# Patient Record
Sex: FEMALE | Race: WHITE | NOT HISPANIC OR LATINO | Employment: FULL TIME
[De-identification: names, ages, dates, MRNs, and addresses within clinical notes are randomized per-mention and may not be internally consistent; named-entity substitution may affect disease eponyms.]

---
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
## 2018-01-02 ENCOUNTER — CHARTING TRANS (OUTPATIENT)
Dept: OTHER | Age: 18
End: 2018-01-02

## 2018-01-10 ENCOUNTER — CHARTING TRANS (OUTPATIENT)
Dept: OTHER | Age: 18
End: 2018-01-10

## 2018-01-12 ENCOUNTER — CHARTING TRANS (OUTPATIENT)
Dept: OTHER | Age: 18
End: 2018-01-12

## 2018-03-15 ENCOUNTER — CHARTING TRANS (OUTPATIENT)
Dept: OTHER | Age: 18
End: 2018-03-15

## 2018-03-21 ENCOUNTER — CHARTING TRANS (OUTPATIENT)
Dept: OTHER | Age: 18
End: 2018-03-21

## 2018-03-21 ENCOUNTER — LAB SERVICES (OUTPATIENT)
Dept: OTHER | Age: 18
End: 2018-03-21

## 2018-03-21 LAB
ALBUMIN: 4.7 GM/DL (ref 3.5–5)
ALKALINE PHOSPHATASE: 65 U/L (ref 38–126)
ALT: 23 U/L (ref 9–72)
ANION GAP: 13 MEQ/L (ref 8–16)
APPEARANCE: NORMAL
AST/SGOT: 19 U/L (ref 14–53)
BASO #: 0.04 K/UL (ref 0–0.2)
BASO %: 1 % (ref 0–2)
BILIRUBIN TOTAL: 0.4 MG/DL (ref 0.2–1.3)
BILIRUBIN: NEGATIVE
BUN: 14 MG/DL (ref 7–17)
CALCIUM: 9.8 MG/DL (ref 8.4–10.2)
CARBON DIOXIDE: 28 MMOL/L (ref 22–30)
CHLORIDE: 103 MMOL/L (ref 98–107)
COLOR: CLEAR
CREATININE: 0.85 MG/DL (ref 0.52–1.04)
EGFR FOR AFRICAN AMERICANS: > 60
EGFR FOR NON-AFRICAN AMERICANS: > 60
EOS #: 0.12 K/UL (ref 0–0.6)
EOS %: 1 % (ref 0–4)
GLUCOSE U: NEGATIVE
GLUCOSE: 101 MG/DL (ref 70–100)
HEMATOCRIT: 41 % (ref 37–47)
HEMOGLOBIN: 13.7 GM/DL (ref 12–16)
KETONES: NEGATIVE
LEUKOCYTE ESTERASE: NORMAL
LYMPH #: 2.38 K/UL (ref 1.2–5.2)
LYMPH %: 29 % (ref 27–40)
MEAN CORPUSCULAR HEMOGLOBIN: 29.9 PG/CELL (ref 27–35)
MEAN CORPUSCULAR HGB CONC: 33.4 G/DL (ref 32–36)
MEAN CORPUSCULAR VOLUME: 89.5 FL (ref 81–102)
MEAN PLATELET VOLUME: 10.8 FL (ref 7.4–12)
MONO #: 0.78 K/UL (ref 0–1)
MONO %: 9 % (ref 2–10)
NEUTROPHILS #: 4.99 K/UL (ref 1.8–8)
NEUTROPHILS %: 60 % (ref 40–62)
NITRITE: NEGATIVE
OCCULT BLOOD: NEGATIVE
PH: 6
PLATELET COUNT: 249 K/UL (ref 145–400)
POTASSIUM: 4.1 MMOL/L (ref 3.6–5)
PROTEIN: NEGATIVE
RED CELL COUNT: 4.58 M/UL (ref 3.8–5.1)
RED CELL DISTRIBUTION WIDTH: 13 % (ref 11.5–14.5)
SODIUM: 144 MMOL/L (ref 137–145)
TOTAL PROTEIN: 8.3 GM/DL (ref 6.3–8.3)
URINE SPEC GRAVITY: 1.02
UROBILINOGEN: 0.2
WHITE BLOOD COUNT: 8.3 K/UL (ref 4.5–13)

## 2018-03-23 ENCOUNTER — CHARTING TRANS (OUTPATIENT)
Dept: OTHER | Age: 18
End: 2018-03-23

## 2018-03-23 LAB — URINE CULTURE: NORMAL

## 2018-10-02 ENCOUNTER — CHARTING TRANS (OUTPATIENT)
Dept: OTHER | Age: 18
End: 2018-10-02

## 2018-10-02 ASSESSMENT — PAIN SCALES - GENERAL: PAINLEVEL_OUTOF10: 0

## 2018-10-17 ENCOUNTER — LAB SERVICES (OUTPATIENT)
Dept: OTHER | Age: 18
End: 2018-10-17

## 2018-10-17 ENCOUNTER — CHARTING TRANS (OUTPATIENT)
Dept: OTHER | Age: 18
End: 2018-10-17

## 2018-10-22 ENCOUNTER — CHARTING TRANS (OUTPATIENT)
Dept: OTHER | Age: 18
End: 2018-10-22

## 2018-10-22 ENCOUNTER — LAB SERVICES (OUTPATIENT)
Dept: OTHER | Age: 18
End: 2018-10-22

## 2018-10-22 LAB
APPEARANCE: CLEAR
BILIRUBIN: NEGATIVE
COLOR: YELLOW
GLUCOSE U: NORMAL
KETONES: NEGATIVE
LEUKOCYTE ESTERASE: NORMAL
NITRITE: NEGATIVE
OCCULT BLOOD: NEGATIVE
PH: 6
PROTEIN: NEGATIVE
URINE SPEC GRAVITY: 1.01
UROBILINOGEN: 0.2

## 2018-10-23 ENCOUNTER — CHARTING TRANS (OUTPATIENT)
Dept: OTHER | Age: 18
End: 2018-10-23

## 2018-10-23 LAB
ALBUMIN SERPL-MCNC: 4.5 G/DL (ref 3.6–5.1)
ALBUMIN/GLOB SERPL: 1.4 (ref 1–2.4)
ALP SERPL-CCNC: 51 UNITS/L (ref 45–117)
ALT SERPL-CCNC: 15 UNITS/L
ANION GAP SERPL CALC-SCNC: 14 MMOL/L (ref 10–20)
AST SERPL-CCNC: 14 UNITS/L
BASOPHILS # BLD: 0.1 K/MCL (ref 0–0.3)
BASOPHILS NFR BLD: 1 %
BILIRUB SERPL-MCNC: 0.8 MG/DL (ref 0.2–1)
BUN SERPL-MCNC: 8 MG/DL (ref 6–20)
BUN/CREAT SERPL: 8 (ref 7–25)
CALCIUM SERPL-MCNC: 9.1 MG/DL (ref 8.4–10.2)
CHLORIDE SERPL-SCNC: 107 MMOL/L (ref 98–107)
CO2 SERPL-SCNC: 22 MMOL/L (ref 21–32)
CREAT SERPL-MCNC: 1.07 MG/DL (ref 0.51–0.95)
DIFFERENTIAL METHOD BLD: NORMAL
EOSINOPHIL # BLD: 0.1 K/MCL (ref 0.1–0.5)
EOSINOPHIL NFR BLD: 2 %
ERYTHROCYTE [DISTWIDTH] IN BLOOD: 12.4 % (ref 11–15)
GLOBULIN SER-MCNC: 3.2 G/DL (ref 2–4)
GLUCOSE SERPL-MCNC: 84 MG/DL (ref 65–99)
HEMATOCRIT: 42.7 % (ref 36–46.5)
HEMOGLOBIN: 14 G/DL (ref 12–15.5)
IMM GRANULOCYTES # BLD AUTO: 0 K/MCL (ref 0–0.2)
IMM GRANULOCYTES NFR BLD: 0 %
LENGTH OF FAST TIME PATIENT: ABNORMAL HRS
LYMPHOCYTES # BLD: 2.3 K/MCL (ref 1.2–5.2)
LYMPHOCYTES NFR BLD: 34 %
MEAN CORPUSCULAR HEMOGLOBIN: 29.2 PG (ref 26–34)
MEAN CORPUSCULAR HGB CONC: 32.8 G/DL (ref 32–36.5)
MEAN CORPUSCULAR VOLUME: 89 FL (ref 78–100)
MONOCYTES # BLD: 0.5 K/MCL (ref 0.3–0.9)
MONOCYTES NFR BLD: 8 %
NEUTROPHILS # BLD: 3.9 K/MCL (ref 1.8–8)
NEUTROPHILS NFR BLD: 55 %
NRBC (NRBCRE): 0 /100 WBC
PLATELET COUNT: 208 K/MCL (ref 140–450)
POTASSIUM SERPL-SCNC: 4 MMOL/L (ref 3.4–5.1)
RED CELL COUNT: 4.8 MIL/MCL (ref 4–5.2)
SODIUM SERPL-SCNC: 139 MMOL/L (ref 135–145)
TOTAL PROTEIN: 7.7 G/DL (ref 6.4–8.2)
WHITE BLOOD COUNT: 6.9 K/MCL (ref 4.2–11)

## 2018-10-24 ENCOUNTER — CHARTING TRANS (OUTPATIENT)
Dept: OTHER | Age: 18
End: 2018-10-24

## 2018-10-24 LAB — BACTERIA UR CULT: NORMAL

## 2018-11-01 VITALS
SYSTOLIC BLOOD PRESSURE: 118 MMHG | WEIGHT: 123.2 LBS | OXYGEN SATURATION: 100 % | DIASTOLIC BLOOD PRESSURE: 70 MMHG | HEIGHT: 62 IN | RESPIRATION RATE: 18 BRPM | TEMPERATURE: 98.1 F | HEART RATE: 121 BPM | BODY MASS INDEX: 22.67 KG/M2

## 2018-11-02 ENCOUNTER — CHARTING TRANS (OUTPATIENT)
Dept: OTHER | Age: 18
End: 2018-11-02

## 2018-11-02 VITALS
DIASTOLIC BLOOD PRESSURE: 85 MMHG | HEART RATE: 97 BPM | SYSTOLIC BLOOD PRESSURE: 118 MMHG | WEIGHT: 125.88 LBS | BODY MASS INDEX: 23.17 KG/M2 | OXYGEN SATURATION: 98 % | HEIGHT: 62 IN | TEMPERATURE: 98.6 F | RESPIRATION RATE: 14 BRPM

## 2018-11-02 VITALS
HEART RATE: 89 BPM | HEIGHT: 62 IN | DIASTOLIC BLOOD PRESSURE: 78 MMHG | SYSTOLIC BLOOD PRESSURE: 120 MMHG | OXYGEN SATURATION: 98 % | WEIGHT: 116.4 LBS | TEMPERATURE: 97.7 F | RESPIRATION RATE: 16 BRPM | BODY MASS INDEX: 21.42 KG/M2

## 2018-11-08 LAB
FLU A AG RAPID SCREEN: NEGATIVE
FLU B AG RAPID SCREEN: NEGATIVE
FLU RAPID SCREEN: NORMAL
SOURCE: NORMAL

## 2018-11-19 ENCOUNTER — CHARTING TRANS (OUTPATIENT)
Dept: OTHER | Age: 18
End: 2018-11-19

## 2018-11-27 VITALS
SYSTOLIC BLOOD PRESSURE: 120 MMHG | RESPIRATION RATE: 16 BRPM | HEART RATE: 106 BPM | OXYGEN SATURATION: 97 % | TEMPERATURE: 98.1 F | HEIGHT: 62 IN | DIASTOLIC BLOOD PRESSURE: 72 MMHG | BODY MASS INDEX: 23.89 KG/M2 | WEIGHT: 129.8 LBS

## 2018-11-27 VITALS
WEIGHT: 130 LBS | OXYGEN SATURATION: 100 % | RESPIRATION RATE: 16 BRPM | TEMPERATURE: 98 F | DIASTOLIC BLOOD PRESSURE: 82 MMHG | HEIGHT: 62 IN | SYSTOLIC BLOOD PRESSURE: 118 MMHG | BODY MASS INDEX: 23.92 KG/M2 | HEART RATE: 90 BPM

## 2018-11-27 VITALS
SYSTOLIC BLOOD PRESSURE: 122 MMHG | RESPIRATION RATE: 18 BRPM | BODY MASS INDEX: 23.52 KG/M2 | WEIGHT: 127.8 LBS | TEMPERATURE: 98.3 F | OXYGEN SATURATION: 97 % | DIASTOLIC BLOOD PRESSURE: 80 MMHG | HEIGHT: 62 IN | HEART RATE: 95 BPM

## 2018-11-27 VITALS
WEIGHT: 127.8 LBS | BODY MASS INDEX: 23.52 KG/M2 | HEART RATE: 90 BPM | SYSTOLIC BLOOD PRESSURE: 132 MMHG | TEMPERATURE: 98.4 F | DIASTOLIC BLOOD PRESSURE: 90 MMHG | OXYGEN SATURATION: 98 % | HEIGHT: 62 IN | RESPIRATION RATE: 16 BRPM

## 2019-01-21 DIAGNOSIS — F41.8 ANXIETY ASSOCIATED WITH DEPRESSION: Primary | ICD-10-CM

## 2019-01-21 RX ORDER — FLUOXETINE HYDROCHLORIDE 20 MG/1
20 CAPSULE ORAL EVERY MORNING
COMMUNITY
Start: 2018-10-02 | End: 2019-01-21 | Stop reason: SDUPTHER

## 2019-01-21 RX ORDER — FLUOXETINE HYDROCHLORIDE 20 MG/1
20 CAPSULE ORAL EVERY MORNING
Qty: 90 CAPSULE | Refills: 0 | Status: SHIPPED | OUTPATIENT
Start: 2019-01-21 | End: 2019-04-19 | Stop reason: SDUPTHER

## 2019-04-19 DIAGNOSIS — F41.8 ANXIETY ASSOCIATED WITH DEPRESSION: ICD-10-CM

## 2019-04-19 RX ORDER — FLUOXETINE HYDROCHLORIDE 20 MG/1
CAPSULE ORAL
Qty: 90 CAPSULE | Refills: 0 | Status: SHIPPED | OUTPATIENT
Start: 2019-04-19 | End: 2019-07-27 | Stop reason: SDUPTHER

## 2019-07-27 DIAGNOSIS — F41.8 ANXIETY ASSOCIATED WITH DEPRESSION: ICD-10-CM

## 2019-07-27 RX ORDER — FLUOXETINE HYDROCHLORIDE 20 MG/1
CAPSULE ORAL
Qty: 90 CAPSULE | Refills: 0 | Status: SHIPPED | OUTPATIENT
Start: 2019-07-27

## 2019-11-18 ENCOUNTER — OFFICE VISIT (OUTPATIENT)
Dept: FAMILY MEDICINE | Age: 19
End: 2019-11-18

## 2019-11-18 DIAGNOSIS — R00.2 RAPID PALPITATIONS: Primary | ICD-10-CM

## 2019-11-18 PROCEDURE — 99213 OFFICE O/P EST LOW 20 MIN: CPT | Performed by: PHYSICIAN ASSISTANT

## 2019-11-18 RX ORDER — ETONOGESTREL/ETHINYL ESTRADIOL .12-.015MG
RING, VAGINAL VAGINAL
COMMUNITY
Start: 2019-11-08

## 2019-11-18 ASSESSMENT — PATIENT HEALTH QUESTIONNAIRE - PHQ9
1. LITTLE INTEREST OR PLEASURE IN DOING THINGS: NOT AT ALL
SUM OF ALL RESPONSES TO PHQ9 QUESTIONS 1 AND 2: 0
SUM OF ALL RESPONSES TO PHQ9 QUESTIONS 1 AND 2: 0
2. FEELING DOWN, DEPRESSED OR HOPELESS: NOT AT ALL

## 2019-11-18 ASSESSMENT — ENCOUNTER SYMPTOMS
FATIGUE: 0
EYE PAIN: 0
WEAKNESS: 0
DIARRHEA: 0
BACK PAIN: 0
VOMITING: 0
EYE ITCHING: 0
SINUS PAIN: 0
SORE THROAT: 0
EYE REDNESS: 0
SHORTNESS OF BREATH: 0
ABDOMINAL PAIN: 0
COUGH: 0
FEVER: 0
NAUSEA: 0
CONSTIPATION: 0
CHILLS: 0

## 2019-11-18 ASSESSMENT — PAIN SCALES - GENERAL: PAINLEVEL: 0

## 2019-11-19 ENCOUNTER — APPOINTMENT (OUTPATIENT)
Dept: FAMILY MEDICINE | Age: 19
End: 2019-11-19

## 2019-11-19 ENCOUNTER — NURSE ONLY (OUTPATIENT)
Dept: FAMILY MEDICINE | Age: 19
End: 2019-11-19

## 2019-11-19 VITALS — HEART RATE: 68 BPM

## 2019-11-19 DIAGNOSIS — Z00.00 PHYSICAL EXAM, ROUTINE: ICD-10-CM

## 2019-11-19 PROCEDURE — X1094 NO CHARGE VISIT: HCPCS

## 2020-05-16 ENCOUNTER — HOSPITAL ENCOUNTER (EMERGENCY)
Dept: HOSPITAL 88 - FSED | Age: 20
Discharge: HOME | End: 2020-05-16
Payer: SELF-PAY

## 2020-05-16 VITALS — HEIGHT: 62 IN | BODY MASS INDEX: 25.26 KG/M2 | WEIGHT: 137.25 LBS

## 2020-05-16 DIAGNOSIS — M25.571: ICD-10-CM
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
## 2020-05-16 DIAGNOSIS — Z86.718: ICD-10-CM

## 2020-05-16 DIAGNOSIS — M25.572: Primary | ICD-10-CM

## 2020-05-16 DIAGNOSIS — S93.401A: ICD-10-CM

## 2020-05-16 DIAGNOSIS — Z91.14: ICD-10-CM

## 2020-05-16 DIAGNOSIS — S93.402A: ICD-10-CM

## 2020-05-16 PROCEDURE — 99282 EMERGENCY DEPT VISIT SF MDM: CPT

## 2020-05-16 NOTE — EMERGENCY DEPARTMENT NOTE
History of Present Illnes


History of Present Illness


Chief Complaint:  bilateral ankle pain


History of Present Illness


This is a 20 year old  female. was doing well until 2 weeks ago then 

inverted both ankles s/ tripping over a huge stone. then some swelling.


Historian:  Patient


Arrival Mode:  Car


History limited by:  condition of the patient (normal)


 Required:  No


Onset (how long ago):  week(s) (2 weeks)


Location:  bilateral ankles


Quality:  sharp


Radiation:  non-radiation


Severity:  mild


Onset quality:  sudden


Duration (how long):  week(s) (2)


Timing of current episode:  constant


Progression:  unchanged


Chronicity:  new


Context:  recent illness, recent surgery, recent immobilization, recent travel; 

trauma/injury; new medications, hx of DVT/PE, non-compliance w/ medications


Relieving factors:  rest


Exacerbating factors:  movement


Associated symptoms:  denies other symptoms


Treatments prior to arrival:  none


Risk factors:  n/a





Past Medical/Family History


Physician Review


I have reviewed the patient's past medical and family history.  Any updates have

been documented here.





Past Medical History


Recent Fever:  No


Clinical Suspicion of Infectio:  No


New/Unexplained Change in Ment:  No


Past Medical History:  None


Past Surgical History:  None


Other Surgery:  


WISDOM TEETH





Social History


Smoking Cessation:  Never Smoker


Counseling Performed:  No


Alcohol Use:  Occasional


Any Illegal Drug Use:  No


TB Exposure/Symptoms:  No


Physically hurt or threatened:  No





Other


Last Tetanus:  UTD


Any Pre-Existing Lines (PICC,:  No


Is patient up to date on immun:  Yes


Last Flu:  none


Last Pneumovax:  none





Review of Systems


Review of Systems


Constitutional:  no symptoms


EENTM:  no symptoms


Cardiovascular:  no symptoms


Respiratory:  no symptoms


Gastrointestinal:  no symptoms


Genitourinary:  no symptoms


Musculoskeletal:  joint pain, joint swelling


Neurological:  no symptoms


Psychological:  no symptoms


Endocrine:  no symptoms


Hematological/Lymphatic:  no symptoms


Review of other systems


All other systems reviewed and negative.





Physical Exam


Related Data


Allergies:  


Coded Allergies:  


     Penicillins (Verified  Allergy, Intermediate, stomach upset/vomiting, 

5/16/20)


     amoxicillin (Verified  Allergy, Unknown, 2/8/17)





Physical Exam


CONSTITUTIONAL





Constitutional:  well-developed, well-nourished


HENT


HENT:  normocephalic, atraumatic, oropharynx clear/moist, nose normal


HENT L/R:  left ext ear normal, right ext ear normal


EYES





Eyes:  PERRL, conjunctivae normal


NECK


Neck:  ROM normal, supple


PULMONARY


Pulmonary:  effort normal, breath sounds normal


CARDIOVASCULAR





Cardiovascular:  regular rhythm, heart sounds normal, capillary refill normal, 

normal rate


GASTROINTESTINAL





Abdominal:  soft, nontender, bowel sounds normal


GENITOURINARY





Genitourinary:  exam deferred


SKIN


Skin:  warm, dry


MUSCULOSKELETAL





Musculoskeletal:  ROM normal, tenderness (bilateral ankles), other (bilateral 

legs normal/nontender/no swelling); 


   edema, deformity, swelling


NEUROLOGICAL





Neurological:  alert, oriented x 3, no gross motor or sensory deficits


PSYCHOLOGICAL


Psychological:  mood/affect normal, judgement normal





Critical Care Time


Subsequent provider


I assumed direction of critical care for this patient from another provider of 

my specialty.





Assessment & Plan


Assessment & Plan


Problems:  


(1) Ankle sprain


Assessment & Plan


take prednisone prn to control pain. f/u nataliia powell-[facility]


Depart Disposition:  HOME, SELF-CARE











RUPERT MAXWELL              May 16, 2020 13:13

## 2021-05-25 VITALS
OXYGEN SATURATION: 98 % | WEIGHT: 150.2 LBS | TEMPERATURE: 97.9 F | RESPIRATION RATE: 16 BRPM | HEART RATE: 74 BPM | HEIGHT: 63 IN | DIASTOLIC BLOOD PRESSURE: 72 MMHG | SYSTOLIC BLOOD PRESSURE: 118 MMHG | BODY MASS INDEX: 26.61 KG/M2